# Patient Record
Sex: FEMALE | HISPANIC OR LATINO
[De-identification: names, ages, dates, MRNs, and addresses within clinical notes are randomized per-mention and may not be internally consistent; named-entity substitution may affect disease eponyms.]

---

## 2023-08-28 PROBLEM — Z00.00 ENCOUNTER FOR PREVENTIVE HEALTH EXAMINATION: Status: ACTIVE | Noted: 2023-08-28

## 2023-09-11 ENCOUNTER — APPOINTMENT (OUTPATIENT)
Dept: COLORECTAL SURGERY | Facility: CLINIC | Age: 88
End: 2023-09-11
Payer: MEDICARE

## 2023-09-11 VITALS
RESPIRATION RATE: 16 BRPM | HEIGHT: 64 IN | WEIGHT: 105 LBS | HEART RATE: 71 BPM | SYSTOLIC BLOOD PRESSURE: 168 MMHG | TEMPERATURE: 98.3 F | DIASTOLIC BLOOD PRESSURE: 76 MMHG | OXYGEN SATURATION: 96 % | BODY MASS INDEX: 17.93 KG/M2

## 2023-09-11 DIAGNOSIS — Z77.22 CONTACT WITH AND (SUSPECTED) EXPOSURE TO ENVIRONMENTAL TOBACCO SMOKE (ACUTE) (CHRONIC): ICD-10-CM

## 2023-09-11 DIAGNOSIS — Z78.9 OTHER SPECIFIED HEALTH STATUS: ICD-10-CM

## 2023-09-11 PROCEDURE — 46600 DIAGNOSTIC ANOSCOPY SPX: CPT

## 2023-09-11 PROCEDURE — 99204 OFFICE O/P NEW MOD 45 MIN: CPT | Mod: 25

## 2023-09-11 RX ORDER — MECLIZINE HYDROCHLORIDE 12.5 MG/1
12.5 TABLET ORAL
Refills: 0 | Status: ACTIVE | COMMUNITY

## 2023-09-11 RX ORDER — HYDROCORTISONE 25 MG/G
2.5 CREAM TOPICAL
Qty: 1 | Refills: 3 | Status: ACTIVE | COMMUNITY
Start: 2023-09-11 | End: 1900-01-01

## 2023-09-11 RX ORDER — ESOMEPRAZOLE MAGNESIUM 40 MG/1
40 CAPSULE, DELAYED RELEASE ORAL
Refills: 0 | Status: ACTIVE | COMMUNITY

## 2023-09-11 RX ORDER — ROSUVASTATIN CALCIUM 10 MG/1
10 TABLET, FILM COATED ORAL
Refills: 0 | Status: ACTIVE | COMMUNITY

## 2023-09-11 RX ORDER — ALBUTEROL SULFATE 1.25 MG/3ML
1.25 SOLUTION RESPIRATORY (INHALATION)
Refills: 0 | Status: ACTIVE | COMMUNITY

## 2023-09-11 RX ORDER — ASPIRIN 81 MG
81 TABLET, DELAYED RELEASE (ENTERIC COATED) ORAL
Refills: 0 | Status: ACTIVE | COMMUNITY

## 2023-09-11 RX ORDER — ACETAMINOPHEN 500 MG
500 TABLET ORAL
Refills: 0 | Status: ACTIVE | COMMUNITY

## 2023-09-11 RX ORDER — MECOBALAMIN 5000 MCG
5000 LOZENGE ORAL
Refills: 0 | Status: ACTIVE | COMMUNITY

## 2024-04-16 ENCOUNTER — APPOINTMENT (OUTPATIENT)
Dept: COLORECTAL SURGERY | Facility: CLINIC | Age: 89
End: 2024-04-16
Payer: MEDICARE

## 2024-04-16 VITALS
OXYGEN SATURATION: 97 % | DIASTOLIC BLOOD PRESSURE: 79 MMHG | SYSTOLIC BLOOD PRESSURE: 176 MMHG | HEART RATE: 71 BPM | HEIGHT: 64 IN

## 2024-04-16 PROCEDURE — 46221 LIGATION OF HEMORRHOID(S): CPT

## 2024-04-16 NOTE — PROCEDURE
[FreeTextEntry1] : Rubber Band Ligation  Pre-procedure Diagnosis: Symptomatic Internal Hemorrhoids Post-procedure Diagnosis: Symptomatic Internal Hemorrhoids Procedure: Anoscopy with Rubber Band Ligation Anesthesia: none Estimated blood loss: minimal Specimen: none Drain: none Complications: none  Procedure Details: Consent obtained. Patient was placed in a modified prone gopal-knife position on the examination table. Digital rectal exam was performed with an index finger with surgical jelly lubricant. An anoscope was inserted into the anal canal. Using the hemorrhoid ligation device, a rubber band was placed around the right posterior hemorrhoid. No active bleeding was noted. The anoscope was removed. The patient tolerated the procedure well.

## 2024-04-16 NOTE — HISTORY OF PRESENT ILLNESS
[FreeTextEntry1] : 89yo Japanese speaking female presents w/ home attendant for follow up  PCP Kris Olmstead  PMH HTN, asthma PSH bladder surgery for prolapse and difficulty with urination Meds - lisinopril, albuterol and a "machine for asthma" Allergy to PCN Former cigarette smoker H/o  x 3 Last colonoscopy   Seen for initial evaluation with home attendant 23 c/o anal irritation with BM. BMs were twice weekly.  Exam noted no external hemorrhoids, no visible prolapsing tissue when asked to strain. Anoscopy noted right posterior prolapsing internal hemorrhoid that spontaneously reduced. Recommended fiber supplement daily and stool softeners. Avoid constipation. Medical management, such as hydrocortisone cream, was discussed. Rx provided. Office based treatment, such as rubber band ligation, was discussed as an alternative.  Returns today c/o unchanged symptoms. Used HC cream BID with little improvement in irritation and has been using this past week as well. Still has tissue that comes out after defecation and feels like its in the way and has to sit on toilet for up to 30 minutes. Admits to hard balls of stool despite drinking lots water. Reports she is taking vitamins, but never started a fiber supplement. Reports she started 1 stool softener but it doesn't work. Denies ASA in the last week

## 2024-04-16 NOTE — ASSESSMENT
[FreeTextEntry1] : Recommend Metamucil and Miralax daily, continue adequate hydration. Avoid straining. Importance of constipation management reviewed. Patient remains bothered by hemorrhoid and requests alternative treatment beyond supportive care. Recommend rubber band ligation, performed today to right posterior hemorrhoid. Post-procedure instructions were reviewed with the patient and her aide, including recommendation to notify office immediately for any symptoms of severe pain, bleeding, inability to urinate, fever, or any other concern. F/u 3-4 weeks.

## 2024-04-16 NOTE — REASON FOR VISIT
[[employer] ] : provided by [employer]   [Interpreters_IDNumber] : [id number] [Interpreters_FullName] : Halie [[id number]] : [place]

## 2024-04-16 NOTE — PHYSICAL EXAM
[FreeTextEntry1] : Medical assistant present for duration of physical examination Home attendant present at request of patient.   General no acute distress, alert and oriented Psych responding appropriately to questions Nonlabored breathing Ambulating with assistance of cane Skin normal color and pigment  Anorectal Exam: Inspection no erythema, induration or fluctuance, no skin excoriation, no fissure, no external hemorrhoids  ЕЛЕНА nontender, no masses palpated, no blood on gloved finger, firm stool balls in anal canal, no blood - stool extracted with digital exam  Procedure: Anoscopy  Pre procedure Diagnosis: hemorrhoids Post procedure Diagnosis: hemorrhoids Anesthesia: none Estimated blood loss: none Specimen: none Complications: none  Consent obtained. Anoscopy was performed by passing a lighted anoscope with lubricant jelly into the anal canal and the entire anal mucosal surface was inspected. Findings included no fissure, right posterior prolapsing internal hemorrhoid that spontaneously reduces  Patient tolerated examination and procedure well.

## 2024-05-21 ENCOUNTER — APPOINTMENT (OUTPATIENT)
Dept: COLORECTAL SURGERY | Facility: CLINIC | Age: 89
End: 2024-05-21
Payer: MEDICARE

## 2024-05-21 VITALS — DIASTOLIC BLOOD PRESSURE: 80 MMHG | OXYGEN SATURATION: 99 % | SYSTOLIC BLOOD PRESSURE: 162 MMHG | HEART RATE: 79 BPM

## 2024-05-21 DIAGNOSIS — K64.9 UNSPECIFIED HEMORRHOIDS: ICD-10-CM

## 2024-05-21 DIAGNOSIS — K59.09 OTHER CONSTIPATION: ICD-10-CM

## 2024-05-21 PROCEDURE — 46600 DIAGNOSTIC ANOSCOPY SPX: CPT

## 2024-05-21 NOTE — HISTORY OF PRESENT ILLNESS
[FreeTextEntry1] : 89 y/o [place]ym[place] speaking F presents for follow up   PMH HTN, asthma PSH bladder surgery for prolapse and difficulty with urination Meds - lisinopril, albuterol and a "machine for asthma" Allergy to PCN Former cigarette smoker H/o  x 3 Last colonoscopy   Seen for initial evaluation with home attendant 23 c/o anal irritation with BM. BMs were twice weekly. Exam noted no external hemorrhoids, no visible prolapsing tissue when asked to strain. Anoscopy noted right posterior prolapsing internal hemorrhoid that spontaneously reduced. Recommended fiber supplement daily and stool softeners. Avoid constipation. Medical management, such as hydrocortisone cream, was discussed. Rx provided. Office based treatment, such as rubber band ligation, was discussed as an alternative.  Patient last seen 24, c/o unchanged symptoms despite using HC cream BID.  Exam noted firm stool balls in anal canal, no blood - stool extracted with digital exam. Right posterior prolapsing internal hemorrhoid that spontaneously reduces.  S/p RBL of right posterior hemorrhoid.  Recommended Metamucil and Miralax for constipation.   Returns today for follow up. She states since treatment, she feels improved. Denies pain or BRB after banding.  No longer feeling hemorrhoid when moving her bowels.   She states she tried to take Miralax as recommended to assist with BM, however she felt that her blood pressure when high after taking the medication. She spoke with her grand daughter who is a nurse, who told her to stop taking the medication and bought her over the counter stool softeners instead.   She is taking stool softeners as needed because she felt the pills made her go to bathroom too frequently. Currently moving bowels approximately 2-3 days per week and is taking stool softeners every other day on average.

## 2024-05-21 NOTE — ASSESSMENT
[FreeTextEntry1] : Symptoms improved after rubber banding. Continue bowel regimen. Avoid straining.  F/u as needed. All questions were answered, patient expressed understanding, and is agreeable to this plan.

## 2024-05-21 NOTE — PHYSICAL EXAM
[FreeTextEntry1] : Medical assistant present for duration of physical examination Home attendant present at request of patient.   General no acute distress, alert and oriented Psych responding appropriately to questions Nonlabored breathing Ambulating with assistance of cane Skin normal color and pigment  Anorectal Exam: Inspection no cellulitis or skin, no skin excoriation, no fissure, no external hemorrhoids  ЕЛЕНА nontender, no masses palpated, no blood on gloved finger  Procedure: Anoscopy  Pre procedure Diagnosis: hemorrhoid Post procedure Diagnosis: hemorrhoid Anesthesia: none Estimated blood loss: none Specimen: none Complications: none  Consent obtained. Anoscopy was performed by passing a lighted anoscope with lubricant jelly into the anal canal and the entire anal mucosal surface was inspected. Findings included no fissure, right posterior well healed post ligation site, no hemorrhoidal inflammation  Patient tolerated examination and procedure well.

## 2024-10-14 ENCOUNTER — APPOINTMENT (OUTPATIENT)
Dept: COLORECTAL SURGERY | Facility: CLINIC | Age: 89
End: 2024-10-14
Payer: MEDICARE

## 2024-10-14 ENCOUNTER — NON-APPOINTMENT (OUTPATIENT)
Age: 89
End: 2024-10-14

## 2024-10-14 VITALS
HEART RATE: 57 BPM | HEIGHT: 64 IN | DIASTOLIC BLOOD PRESSURE: 75 MMHG | WEIGHT: 105 LBS | OXYGEN SATURATION: 97 % | SYSTOLIC BLOOD PRESSURE: 166 MMHG | BODY MASS INDEX: 17.93 KG/M2

## 2024-10-14 DIAGNOSIS — K59.09 OTHER CONSTIPATION: ICD-10-CM

## 2024-10-14 PROCEDURE — 99213 OFFICE O/P EST LOW 20 MIN: CPT
